# Patient Record
Sex: MALE | Race: OTHER | Employment: OTHER | URBAN - METROPOLITAN AREA
[De-identification: names, ages, dates, MRNs, and addresses within clinical notes are randomized per-mention and may not be internally consistent; named-entity substitution may affect disease eponyms.]

---

## 2022-02-10 ENCOUNTER — ESTABLISHED PATIENT (OUTPATIENT)
Dept: URBAN - METROPOLITAN AREA CLINIC 10 | Facility: CLINIC | Age: 77
End: 2022-02-10

## 2022-02-10 DIAGNOSIS — H52.223: ICD-10-CM

## 2022-02-10 DIAGNOSIS — H43.313: ICD-10-CM

## 2022-02-10 PROCEDURE — 92014 COMPRE OPH EXAM EST PT 1/>: CPT

## 2022-02-10 PROCEDURE — 92015 DETERMINE REFRACTIVE STATE: CPT

## 2022-02-10 PROCEDURE — 92250 FUNDUS PHOTOGRAPHY W/I&R: CPT

## 2022-02-10 ASSESSMENT — VISUAL ACUITY
OS_SC: 20/30
OD_SC: 20/50+2

## 2022-05-09 ENCOUNTER — PREPPED CHART (OUTPATIENT)
Dept: URBAN - METROPOLITAN AREA CLINIC 7 | Facility: CLINIC | Age: 77
End: 2022-05-09

## 2022-05-16 ENCOUNTER — CONTACT LENSES/GLASSES VISIT (OUTPATIENT)
Dept: URBAN - METROPOLITAN AREA CLINIC 7 | Facility: CLINIC | Age: 77
End: 2022-05-16

## 2022-05-16 DIAGNOSIS — Z98.42: ICD-10-CM

## 2022-05-16 DIAGNOSIS — H52.4: ICD-10-CM

## 2022-05-16 DIAGNOSIS — H52.223: ICD-10-CM

## 2022-05-16 DIAGNOSIS — Z98.41: ICD-10-CM

## 2022-05-16 PROCEDURE — 99211NC NO CHARGE VISIT

## 2022-05-16 ASSESSMENT — VISUAL ACUITY
OS_CC: 20/20
OD_CC: 20/20
OU_CC: 20/20

## 2022-07-02 RX ORDER — OXYCODONE HYDROCHLORIDE 5 MG/1
TABLET ORAL
COMMUNITY
Start: 2022-05-16 | End: 2022-10-27

## 2022-07-02 RX ORDER — GABAPENTIN 100 MG/1
1 CAPSULE ORAL
COMMUNITY
Start: 2022-05-16

## 2022-07-02 RX ORDER — CELECOXIB 200 MG/1
CAPSULE ORAL
COMMUNITY

## 2022-10-27 PROBLEM — Z96.652 HISTORY OF TOTAL KNEE REPLACEMENT, LEFT: Status: ACTIVE | Noted: 2022-10-27

## 2023-04-17 ENCOUNTER — ESTABLISHED PATIENT (OUTPATIENT)
Facility: LOCATION | Age: 78
End: 2023-04-17

## 2023-04-17 DIAGNOSIS — H52.4: ICD-10-CM

## 2023-04-17 DIAGNOSIS — H43.812: ICD-10-CM

## 2023-04-17 DIAGNOSIS — H52.223: ICD-10-CM

## 2023-04-17 PROCEDURE — 92015 DETERMINE REFRACTIVE STATE: CPT

## 2023-04-17 PROCEDURE — 92014 COMPRE OPH EXAM EST PT 1/>: CPT

## 2023-04-17 PROCEDURE — 92250 FUNDUS PHOTOGRAPHY W/I&R: CPT

## 2023-04-17 ASSESSMENT — TONOMETRY
OS_IOP_MMHG: 12
OD_IOP_MMHG: 14

## 2023-04-17 ASSESSMENT — VISUAL ACUITY
OS_SC: 20/25-2
OU_SC: 20/20-1
OD_SC: 20/25-2

## 2024-04-22 ENCOUNTER — ESTABLISHED PATIENT (OUTPATIENT)
Facility: LOCATION | Age: 79
End: 2024-04-22

## 2024-04-22 DIAGNOSIS — H43.813: ICD-10-CM

## 2024-04-22 DIAGNOSIS — H52.4: ICD-10-CM

## 2024-04-22 PROCEDURE — 92015 DETERMINE REFRACTIVE STATE: CPT

## 2024-04-22 PROCEDURE — 92014 COMPRE OPH EXAM EST PT 1/>: CPT

## 2024-04-22 PROCEDURE — 92250 FUNDUS PHOTOGRAPHY W/I&R: CPT

## 2024-04-22 ASSESSMENT — VISUAL ACUITY
OU_CC: 20/25-2
OD_CC: 20/25+2
OS_CC: 20/25-2

## 2024-04-22 ASSESSMENT — TONOMETRY
OD_IOP_MMHG: 10
OS_IOP_MMHG: 12

## 2024-07-05 NOTE — PATIENT DISCUSSION
Discussed lid hygiene, warm compress and eyelid wash. LRF 04/16/2024  LOV 04/16/2024  F/U 10/17/2024

## 2025-06-26 ENCOUNTER — COMPREHENSIVE EXAM (OUTPATIENT)
Age: 80
End: 2025-06-26

## 2025-06-26 DIAGNOSIS — H52.4: ICD-10-CM

## 2025-06-26 DIAGNOSIS — Z98.42: ICD-10-CM

## 2025-06-26 DIAGNOSIS — H43.813: ICD-10-CM

## 2025-06-26 DIAGNOSIS — Z98.41: ICD-10-CM

## 2025-06-26 PROCEDURE — 92015 DETERMINE REFRACTIVE STATE: CPT

## 2025-06-26 PROCEDURE — 92250 FUNDUS PHOTOGRAPHY W/I&R: CPT

## 2025-06-26 PROCEDURE — 92014 COMPRE OPH EXAM EST PT 1/>: CPT
